# Patient Record
Sex: FEMALE | Race: OTHER | NOT HISPANIC OR LATINO
[De-identification: names, ages, dates, MRNs, and addresses within clinical notes are randomized per-mention and may not be internally consistent; named-entity substitution may affect disease eponyms.]

---

## 2021-01-04 DIAGNOSIS — Z82.5 FAMILY HISTORY OF ASTHMA AND OTHER CHRONIC LOWER RESPIRATORY DISEASES: ICD-10-CM

## 2021-01-04 DIAGNOSIS — R92.2 INCONCLUSIVE MAMMOGRAM: ICD-10-CM

## 2021-01-04 DIAGNOSIS — Z86.000 PERSONAL HISTORY OF IN-SITU NEOPLASM OF BREAST: ICD-10-CM

## 2021-01-04 DIAGNOSIS — Z80.1 FAMILY HISTORY OF MALIGNANT NEOPLASM OF TRACHEA, BRONCHUS AND LUNG: ICD-10-CM

## 2021-01-04 DIAGNOSIS — I80.9 PHLEBITIS AND THROMBOPHLEBITIS OF UNSPECIFIED SITE: ICD-10-CM

## 2021-01-04 DIAGNOSIS — Z86.39 PERSONAL HISTORY OF OTHER ENDOCRINE, NUTRITIONAL AND METABOLIC DISEASE: ICD-10-CM

## 2021-01-04 DIAGNOSIS — Z87.891 PERSONAL HISTORY OF NICOTINE DEPENDENCE: ICD-10-CM

## 2021-01-04 DIAGNOSIS — Z82.49 FAMILY HISTORY OF ISCHEMIC HEART DISEASE AND OTHER DISEASES OF THE CIRCULATORY SYSTEM: ICD-10-CM

## 2021-01-04 PROBLEM — Z00.00 ENCOUNTER FOR PREVENTIVE HEALTH EXAMINATION: Status: ACTIVE | Noted: 2021-01-04

## 2021-01-04 RX ORDER — PRAVASTATIN SODIUM 40 MG/1
40 TABLET ORAL
Refills: 0 | Status: ACTIVE | COMMUNITY

## 2021-01-06 ENCOUNTER — APPOINTMENT (OUTPATIENT)
Dept: BREAST CENTER | Facility: CLINIC | Age: 76
End: 2021-01-06

## 2021-05-12 ENCOUNTER — NON-APPOINTMENT (OUTPATIENT)
Age: 76
End: 2021-05-12

## 2021-05-12 ENCOUNTER — APPOINTMENT (OUTPATIENT)
Dept: BREAST CENTER | Facility: CLINIC | Age: 76
End: 2021-05-12
Payer: MEDICARE

## 2021-05-12 VITALS
OXYGEN SATURATION: 100 % | HEART RATE: 96 BPM | WEIGHT: 105 LBS | BODY MASS INDEX: 19.83 KG/M2 | SYSTOLIC BLOOD PRESSURE: 164 MMHG | HEIGHT: 61 IN | DIASTOLIC BLOOD PRESSURE: 100 MMHG

## 2021-05-12 PROCEDURE — 99072 ADDL SUPL MATRL&STAF TM PHE: CPT

## 2021-05-12 PROCEDURE — 99213 OFFICE O/P EST LOW 20 MIN: CPT

## 2021-05-12 RX ORDER — CRANBERRY FRUIT EXTRACT 200 MG
CAPSULE ORAL
Refills: 0 | Status: ACTIVE | COMMUNITY

## 2021-05-12 RX ORDER — CHROMIUM 200 MCG
TABLET ORAL
Refills: 0 | Status: ACTIVE | COMMUNITY

## 2021-05-12 RX ORDER — ASPIRIN 325 MG
TABLET ORAL
Refills: 0 | Status: ACTIVE | COMMUNITY

## 2021-05-12 NOTE — PHYSICAL EXAM
[Normocephalic] : normocephalic [Atraumatic] : atraumatic [EOMI] : extra ocular movement intact [Supple] : supple [No Supraclavicular Adenopathy] : no supraclavicular adenopathy [No Cervical Adenopathy] : no cervical adenopathy [Examined in the supine and seated position] : examined in the supine and seated position [No dominant masses] : no dominant masses in right breast  [No dominant masses] : no dominant masses left breast [No Nipple Retraction] : no left nipple retraction [No Nipple Discharge] : no left nipple discharge [Breast Mass Right Breast ___cm] : no masses [Breast Mass Left Breast ___cm] : no masses [Breast Nipple Inversion] : nipples not inverted [Breast Nipple Retraction] : nipples not retracted [Breast Nipple Flattening] : nipples not flattened [Breast Nipple Fissures] : nipples not fissured [Breast Abnormal Lactation (Galactorrhea)] : no galactorrhea [Breast Abnormal Secretion Bloody Fluid] : no bloody discharge [Breast Abnormal Secretion Serous Fluid] : no serous discharge [Breast Abnormal Secretion Opalescent Fluid] : no milky discharge [No Edema] : no edema [No Rashes] : no rashes [No Ulceration] : no ulceration [de-identified] : On exam, the patient has A-cup breasts.  On palpation I cannot feel any suspicious densities in either breast.  She has no axillary, supraclavicular, or cervical adenopathy. 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

## 2021-05-12 NOTE — HISTORY OF PRESENT ILLNESS
[FreeTextEntry1] : The patient is a 76-year-old G1, P2 postmenopausal white female who had a set of twins at age 41.  She underwent menopause at in her early 50s and was on hormone replacement therapy for 5 years in the past.  She was diagnosed with a right breast DCIS in 2001 and was treated down to [facility] and underwent a wide excision and radiation therapy through Dr. Wise at [place].  She did not have any nodes removed.  She did not receive any chemotherapy.  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  She has no family history of breast or ovarian cancer.  She was followed by Dr. Chaim Trinidad as well.  She has been doing well and comes in for routine yearly exam and gets yearly mammography.

## 2021-05-12 NOTE — PAST MEDICAL HISTORY
[Postmenopausal] : The patient is postmenopausal [Menopause Age____] : age at menopause was [unfilled] [History of Hormone Replacement Treatment] : has a history of hormone replacement treatment [Total Preg ___] : G[unfilled] [Live Births ___] : P[unfilled]  [Age At Live Birth ___] : Age at live birth: [unfilled] [Menarche Age ____] : age at menarche was [unfilled] [Abortions ___] : Abortions:[unfilled]

## 2021-05-12 NOTE — REASON FOR VISIT
[Follow-Up: _____] : a [unfilled] follow-up visit [FreeTextEntry1] : The patient comes in with a history of a right breast partial mastectomy performed back in 2001 for DCIS at [place] for which she underwent radiation therapy but took no endocrine therapy.

## 2021-05-12 NOTE — ASSESSMENT
[FreeTextEntry1] : The patient is a 76-year-old G1, P2 postmenopausal white female who had a set of twins at age 41.  She underwent menopause at in her early 50s and was on hormone replacement therapy for 5 years in the past.  She was diagnosed with a right breast DCIS in 2001 and was treated down to [facility] and underwent a wide excision and radiation therapy through Dr. Wise at [place].  She did not have any nodes removed.  She did not receive any chemotherapy.  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  She has no family history of breast or ovarian cancer.  She was followed by Dr. Chaim hollingsworth as well.  On exam today I cannot feel any evidence of recurrence in the right breast and no suspicious findings in the left breast.  She underwent her last bilateral mammography and ultrasound at [facility] on May 5, 2021 which showed no suspicious findings.  The patient was reassured and she will continue yearly follow-up and her next bilateral mammography and ultrasound will be due at that time in May 2022.

## 2022-05-31 NOTE — PAST MEDICAL HISTORY
[Postmenopausal] : The patient is postmenopausal [Menarche Age ____] : age at menarche was [unfilled] [Menopause Age____] : age at menopause was [unfilled] [History of Hormone Replacement Treatment] : has a history of hormone replacement treatment [Total Preg ___] : G[unfilled] [Live Births ___] : P[unfilled]  [Abortions ___] : Abortions:[unfilled] [Age At Live Birth ___] : Age at live birth: [unfilled]

## 2022-06-02 ENCOUNTER — APPOINTMENT (OUTPATIENT)
Dept: BREAST CENTER | Facility: CLINIC | Age: 77
End: 2022-06-02
Payer: MEDICARE

## 2022-06-02 VITALS
DIASTOLIC BLOOD PRESSURE: 72 MMHG | SYSTOLIC BLOOD PRESSURE: 108 MMHG | WEIGHT: 105 LBS | BODY MASS INDEX: 19.84 KG/M2 | HEART RATE: 74 BPM | OXYGEN SATURATION: 99 %

## 2022-06-02 PROCEDURE — 99213 OFFICE O/P EST LOW 20 MIN: CPT

## 2022-06-02 NOTE — PHYSICAL EXAM
[Normocephalic] : normocephalic [Atraumatic] : atraumatic [EOMI] : extra ocular movement intact [Supple] : supple [No Supraclavicular Adenopathy] : no supraclavicular adenopathy [No Cervical Adenopathy] : no cervical adenopathy [Examined in the supine and seated position] : examined in the supine and seated position [No dominant masses] : no dominant masses in right breast  [No dominant masses] : no dominant masses left breast [No Nipple Retraction] : no left nipple retraction [No Nipple Discharge] : no left nipple discharge [Breast Mass Right Breast ___cm] : no masses [Breast Mass Left Breast ___cm] : no masses [No Edema] : no edema [No Rashes] : no rashes [No Ulceration] : no ulceration [Breast Nipple Inversion] : nipples not inverted [Breast Nipple Retraction] : nipples not retracted [Breast Nipple Flattening] : nipples not flattened [Breast Nipple Fissures] : nipples not fissured [Breast Abnormal Lactation (Galactorrhea)] : no galactorrhea [Breast Abnormal Secretion Bloody Fluid] : no bloody discharge [Breast Abnormal Secretion Serous Fluid] : no serous discharge [Breast Abnormal Secretion Opalescent Fluid] : no milky discharge [de-identified] : On exam, the patient has A-cup breasts.  On palpation I cannot feel any suspicious densities in either breast.  She has no axillary, supraclavicular, or cervical adenopathy. 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

## 2022-06-02 NOTE — HISTORY OF PRESENT ILLNESS
[FreeTextEntry1] : The patient is a 77-year-old G1, P2 postmenopausal white female who had a set of twins at age 41.  She underwent menopause at in her early 50s and was on hormone replacement therapy for 5 years in the past.  She was diagnosed with a right breast DCIS in 2001 and was treated down to [facility] and underwent a wide excision and radiation therapy through Dr. Wise at [place].  She did not have any nodes removed.  She did not receive any chemotherapy.  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  She has no family history of breast or ovarian cancer.  She was followed by Dr. Chaim Trinidad as well.  She has been doing well and comes in for routine yearly exam and gets yearly mammography.

## 2022-06-02 NOTE — REASON FOR VISIT
[Follow-Up: _____] : a [unfilled] follow-up visit [FreeTextEntry1] : The patient comes in with a history of a right breast partial mastectomy performed back in 2001 for DCIS at [place] for which she underwent radiation therapy but took no endocrine therapy.

## 2022-06-02 NOTE — ASSESSMENT
[FreeTextEntry1] : The patient is a 77-year-old G1, P2 postmenopausal white female who had a set of twins at age 41.  She underwent menopause at in her early 50s and was on hormone replacement therapy for 5 years in the past.  She was diagnosed with a right breast DCIS in 2001 and was treated down to [facility] and underwent a wide excision and radiation therapy through Dr. Wise at [place].  She did not have any nodes removed.  She did not receive any chemotherapy.  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  She has no family history of breast or ovarian cancer.  She was followed by Dr. Chaim Kirkland as well.  On exam today, I cannot feel any evidence of recurrence in the right breast and no suspicious findings in the left breast.  She underwent her last last bilateral mammography and ultrasound which was reviewed from May 9, 2022 and performed at [facility] which showed no suspicious findings.  The patient was reassured and she will continue yearly follow-up and her next bilateral mammography and ultrasound will be due at that time in May 2023 and she was given prescriptions.

## 2023-05-02 ENCOUNTER — NON-APPOINTMENT (OUTPATIENT)
Age: 78
End: 2023-05-02

## 2023-05-02 NOTE — PHYSICAL EXAM
[Normocephalic] : normocephalic [Atraumatic] : atraumatic [EOMI] : extra ocular movement intact [Supple] : supple [No Supraclavicular Adenopathy] : no supraclavicular adenopathy [No Cervical Adenopathy] : no cervical adenopathy [Examined in the supine and seated position] : examined in the supine and seated position [No dominant masses] : no dominant masses in right breast  [No dominant masses] : no dominant masses left breast [No Nipple Retraction] : no left nipple retraction [No Nipple Discharge] : no left nipple discharge [Breast Mass Right Breast ___cm] : no masses [Breast Mass Left Breast ___cm] : no masses [No Edema] : no edema [No Rashes] : no rashes [No Ulceration] : no ulceration [Breast Nipple Inversion] : nipples not inverted [Breast Nipple Retraction] : nipples not retracted [Breast Nipple Flattening] : nipples not flattened [Breast Nipple Fissures] : nipples not fissured [Breast Abnormal Lactation (Galactorrhea)] : no galactorrhea [Breast Abnormal Secretion Bloody Fluid] : no bloody discharge [Breast Abnormal Secretion Serous Fluid] : no serous discharge [Breast Abnormal Secretion Opalescent Fluid] : no milky discharge [de-identified] : On exam, the patient has A-cup breasts.  On palpation I cannot feel any suspicious densities in either breast.  She has no axillary, supraclavicular, or cervical adenopathy. 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

## 2023-05-02 NOTE — REASON FOR VISIT
[Follow-Up: _____] : a [unfilled] follow-up visit [FreeTextEntry1] : The patient comes in with a history of a right breast partial mastectomy performed back in 2001 for DCIS at [place] for which she underwent radiation therapy but took no endocrine therapy.

## 2023-05-02 NOTE — HISTORY OF PRESENT ILLNESS
[FreeTextEntry1] : The patient is a 78-year-old G1, P2 postmenopausal white female who had a set of twins at age 41.  She underwent menopause at in her early 50s and was on hormone replacement therapy for 5 years in the past.  She was diagnosed with a right breast DCIS in 2001 and was treated down to [facility] and underwent a wide excision and radiation therapy through Dr. Yan at [place].  She did not have any nodes removed.  She did not receive any chemotherapy.  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  She has no family history of breast or ovarian cancer.  She was followed by Dr. Chaim Trinidad as well.  She has been doing well and comes in for routine yearly exam and gets yearly mammography.

## 2023-05-02 NOTE — ASSESSMENT
[FreeTextEntry1] : The patient is a 78-year-old G1, P2 postmenopausal white female who had a set of twins at age 41.  She underwent menopause at in her early 50s and was on hormone replacement therapy for 5 years in the past.  She was diagnosed with a right breast DCIS in 2001 and was treated down at [facility] and underwent a wide excision and radiation therapy through Dr. Yan at [place].  She did not have any nodes removed.  She did not receive any chemotherapy.  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  She has no family history of breast or ovarian cancer.  She was followed by Dr. Chaim Kirkland as well.  On exam today, I cannot feel any evidence of recurrence in the right breast and no suspicious findings in the left breast.  She underwent her last last bilateral mammography and ultrasound which was reviewed from ??????? May 9, 2022 and performed at [facility] which showed no suspicious findings.  The patient was reassured and she will continue yearly follow-up and her next bilateral mammography and ultrasound will be due at that time in ????????? May 2024 and she was given prescriptions.

## 2023-05-09 ENCOUNTER — RESULT REVIEW (OUTPATIENT)
Age: 78
End: 2023-05-09

## 2023-05-10 ENCOUNTER — APPOINTMENT (OUTPATIENT)
Dept: BREAST CENTER | Facility: CLINIC | Age: 78
End: 2023-05-10
Payer: MEDICARE

## 2023-05-10 PROCEDURE — 99213 OFFICE O/P EST LOW 20 MIN: CPT

## 2023-05-10 NOTE — ASSESSMENT
[FreeTextEntry1] : The patient is a 78-year-old G1, P2 postmenopausal white female who had a set of twins at age 41.  She underwent menopause at in her early 50s and was on hormone replacement therapy for 5 years in the past.  She was diagnosed with a right breast DCIS in 2001 and was treated down at [facility] and underwent a wide excision and radiation therapy through Dr. Yan at [place].  She did not have any nodes removed.  She did not receive any chemotherapy.  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  She has no family history of breast or ovarian cancer.  She was followed by Dr. Chaim Kirkland as well.  On exam today, I cannot feel any evidence of recurrence in the right breast and no suspicious findings in the left breast.  She underwent her last last bilateral mammography and ultrasound which was reviewed from today on May 10, 2023 and performed at [facility] which showed no suspicious findings.  The patient was reassured and she will continue yearly follow-up and her next bilateral mammography and ultrasound will be due at that time in May 2024 and she was given prescriptions.

## 2023-05-10 NOTE — REASON FOR VISIT
[Follow-Up: _____] : a [unfilled] follow-up visit [FreeTextEntry1] : The patient comes in with a history of a right breast partial mastectomy performed back in 2001 for DCIS at [place] for which she underwent radiation therapy but took no endocrine therapy.

## 2023-05-10 NOTE — HISTORY OF PRESENT ILLNESS
[FreeTextEntry1] : The patient is a 78-year-old G1, P2 postmenopausal white female who had a set of twins at age 41.  She underwent menopause at in her early 50s and was on hormone replacement therapy for 5 years in the past.  She was diagnosed with a right breast DCIS in 2001 and was treated down to [facility] and underwent a wide excision and radiation therapy through Dr. Yan at [place].  She did not have any nodes removed.  She did not receive any chemotherapy.  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  She has no family history of breast or ovarian cancer.  She was followed by Dr. Chaim Trinidad as well.  She has been doing well and comes in for routine yearly exam and gets yearly mammography.

## 2023-05-10 NOTE — PHYSICAL EXAM
[Normocephalic] : normocephalic [Atraumatic] : atraumatic [EOMI] : extra ocular movement intact [Supple] : supple [No Supraclavicular Adenopathy] : no supraclavicular adenopathy [No Cervical Adenopathy] : no cervical adenopathy [Examined in the supine and seated position] : examined in the supine and seated position [No dominant masses] : no dominant masses left breast [No dominant masses] : no dominant masses in right breast  [No Nipple Retraction] : no left nipple retraction [No Nipple Discharge] : no left nipple discharge [Breast Mass Right Breast ___cm] : no masses [Breast Mass Left Breast ___cm] : no masses [No Edema] : no edema [No Rashes] : no rashes [No Ulceration] : no ulceration [Breast Nipple Inversion] : nipples not inverted [Breast Nipple Retraction] : nipples not retracted [Breast Nipple Flattening] : nipples not flattened [Breast Nipple Fissures] : nipples not fissured [Breast Abnormal Lactation (Galactorrhea)] : no galactorrhea [Breast Abnormal Secretion Bloody Fluid] : no bloody discharge [Breast Abnormal Secretion Serous Fluid] : no serous discharge [Breast Abnormal Secretion Opalescent Fluid] : no milky discharge [de-identified] : On exam, the patient has A-cup breasts.  On palpation I cannot feel any suspicious densities in either breast.  She has no axillary, supraclavicular, or cervical adenopathy. 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

## 2024-04-25 ENCOUNTER — NON-APPOINTMENT (OUTPATIENT)
Age: 79
End: 2024-04-25

## 2024-05-15 NOTE — REASON FOR VISIT
[Follow-Up: _____] : a [unfilled] follow-up visit [FreeTextEntry1] : The patient comes in with a history of a right breast partial mastectomy performed back in 2001 for DCIS at [place] for which she underwent radiation therapy but took no endocrine therapy.

## 2024-05-15 NOTE — ASSESSMENT
[FreeTextEntry1] : The patient is a 79-year-old G1, P2 postmenopausal white female who had a set of twins at age 41.  She underwent menopause at in her early 50s and was on hormone replacement therapy for 5 years in the past.  She was diagnosed with a right breast DCIS in 2001 and was treated down at [facility] and underwent a wide excision and radiation therapy through Dr. Yan at [place].  She did not have any nodes removed.  She did not receive any chemotherapy.  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  She has no family history of breast or ovarian cancer.  She was followed by Dr. Chaim Kirkland as well.  On exam today, I cannot feel any evidence of recurrence in the right breast and no suspicious findings in the left breast.  She underwent her last bilateral mammography and ultrasound which was reviewed from today on ?????? May 10, 2023 and performed at [facility] which showed no suspicious findings.  The patient was reassured and she will continue yearly follow-up and her next bilateral mammography and ultrasound will be due at that time in ?????? May 2025 and she was given prescriptions.

## 2024-05-15 NOTE — HISTORY OF PRESENT ILLNESS
[FreeTextEntry1] : The patient is a 79-year-old G1, P2 postmenopausal white female who had a set of twins at age 41.  She underwent menopause at in her early 50s and was on hormone replacement therapy for 5 years in the past.  She was diagnosed with a right breast DCIS in 2001 and was treated down to [facility] and underwent a wide excision and radiation therapy through Dr. Yan at [place].  She did not have any nodes removed.  She did not receive any chemotherapy.  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  She has no family history of breast or ovarian cancer.  She was followed by Dr. Chaim Trinidad as well.  She has been doing well and comes in for routine yearly exam and gets yearly mammography.

## 2024-05-15 NOTE — PHYSICAL EXAM
[Normocephalic] : normocephalic [Atraumatic] : atraumatic [EOMI] : extra ocular movement intact [Supple] : supple [No Supraclavicular Adenopathy] : no supraclavicular adenopathy [No Cervical Adenopathy] : no cervical adenopathy [Examined in the supine and seated position] : examined in the supine and seated position [No dominant masses] : no dominant masses in right breast  [No dominant masses] : no dominant masses left breast [No Nipple Retraction] : no left nipple retraction [No Nipple Discharge] : no left nipple discharge [Breast Mass Right Breast ___cm] : no masses [Breast Mass Left Breast ___cm] : no masses [No Edema] : no edema [No Rashes] : no rashes [No Ulceration] : no ulceration [Breast Nipple Inversion] : nipples not inverted [Breast Nipple Retraction] : nipples not retracted [Breast Nipple Flattening] : nipples not flattened [Breast Nipple Fissures] : nipples not fissured [Breast Abnormal Lactation (Galactorrhea)] : no galactorrhea [Breast Abnormal Secretion Bloody Fluid] : no bloody discharge [Breast Abnormal Secretion Serous Fluid] : no serous discharge [Breast Abnormal Secretion Opalescent Fluid] : no milky discharge [de-identified] : On exam, the patient has A-cup breasts.  On palpation I cannot feel any suspicious densities in either breast.  She has no axillary, supraclavicular, or cervical adenopathy. 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

## 2024-05-21 ENCOUNTER — RESULT REVIEW (OUTPATIENT)
Age: 79
End: 2024-05-21

## 2024-05-22 ENCOUNTER — APPOINTMENT (OUTPATIENT)
Dept: BREAST CENTER | Facility: CLINIC | Age: 79
End: 2024-05-22
Payer: MEDICARE

## 2024-05-22 VITALS
HEART RATE: 82 BPM | BODY MASS INDEX: 19.83 KG/M2 | SYSTOLIC BLOOD PRESSURE: 111 MMHG | OXYGEN SATURATION: 99 % | DIASTOLIC BLOOD PRESSURE: 74 MMHG | WEIGHT: 105 LBS | HEIGHT: 61 IN

## 2024-05-22 DIAGNOSIS — Z12.39 ENCOUNTER FOR OTHER SCREENING FOR MALIGNANT NEOPLASM OF BREAST: ICD-10-CM

## 2024-05-22 DIAGNOSIS — Z90.11 ACQUIRED ABSENCE OF RIGHT BREAST AND NIPPLE: ICD-10-CM

## 2024-05-22 DIAGNOSIS — Z85.3 PERSONAL HISTORY OF MALIGNANT NEOPLASM OF BREAST: ICD-10-CM

## 2024-05-22 PROCEDURE — 99213 OFFICE O/P EST LOW 20 MIN: CPT

## 2024-05-22 NOTE — ASSESSMENT
[FreeTextEntry1] : The patient is a 79-year-old G1, P2 postmenopausal white female who had a set of twins at age 41.  She underwent menopause at in her early 50s and was on hormone replacement therapy for 5 years in the past.  She was diagnosed with a right breast DCIS in 2001 and was treated down at [facility] and underwent a wide excision and radiation therapy through Dr. Yan at [place].  She did not have any nodes removed.  She did not receive any chemotherapy.  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  She has no family history of breast or ovarian cancer.  She was followed by Dr. Chaim Kirkland as well.  On exam today, I cannot feel any evidence of recurrence in the right breast and no suspicious findings in the left breast.  She underwent her last bilateral mammography and ultrasound which was reviewed from today on May 22, 2024 and performed at [facility] which showed no suspicious findings.  The patient was reassured and she will continue yearly follow-up and her next bilateral mammography and ultrasound will be due at that time in May 2025 and she was given prescriptions.

## 2024-05-22 NOTE — PHYSICAL EXAM
[Normocephalic] : normocephalic [Atraumatic] : atraumatic [EOMI] : extra ocular movement intact [Supple] : supple [No Supraclavicular Adenopathy] : no supraclavicular adenopathy [No Cervical Adenopathy] : no cervical adenopathy [Examined in the supine and seated position] : examined in the supine and seated position [No dominant masses] : no dominant masses in right breast  [No dominant masses] : no dominant masses left breast [No Nipple Retraction] : no left nipple retraction [No Nipple Discharge] : no left nipple discharge [Breast Mass Right Breast ___cm] : no masses [Breast Mass Left Breast ___cm] : no masses [No Rashes] : no rashes [No Edema] : no edema [No Ulceration] : no ulceration [Breast Nipple Inversion] : nipples not inverted [Breast Nipple Retraction] : nipples not retracted [Breast Nipple Flattening] : nipples not flattened [Breast Nipple Fissures] : nipples not fissured [Breast Abnormal Lactation (Galactorrhea)] : no galactorrhea [Breast Abnormal Secretion Bloody Fluid] : no bloody discharge [Breast Abnormal Secretion Serous Fluid] : no serous discharge [Breast Abnormal Secretion Opalescent Fluid] : no milky discharge [de-identified] : On exam, the patient has A-cup breasts.  On palpation I cannot feel any suspicious densities in either breast.  She has no axillary, supraclavicular, or cervical adenopathy. 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

## 2024-05-22 NOTE — REASON FOR VISIT
[Follow-Up: _____] : a [unfilled] follow-up visit [FreeTextEntry1] : The patient comes in with a history of a right breast partial mastectomy performed back in 2001 for DCIS at [place] for which she underwent radiation therapy but took no endocrine therapy.

## 2025-04-16 ENCOUNTER — NON-APPOINTMENT (OUTPATIENT)
Age: 80
End: 2025-04-16

## 2025-05-27 ENCOUNTER — APPOINTMENT (OUTPATIENT)
Dept: BREAST CENTER | Facility: CLINIC | Age: 80
End: 2025-05-27
Payer: MEDICARE

## 2025-05-27 ENCOUNTER — NON-APPOINTMENT (OUTPATIENT)
Age: 80
End: 2025-05-27

## 2025-05-27 ENCOUNTER — RESULT REVIEW (OUTPATIENT)
Age: 80
End: 2025-05-27

## 2025-05-27 VITALS
OXYGEN SATURATION: 96 % | BODY MASS INDEX: 20.2 KG/M2 | WEIGHT: 107 LBS | HEIGHT: 61 IN | DIASTOLIC BLOOD PRESSURE: 70 MMHG | SYSTOLIC BLOOD PRESSURE: 131 MMHG | HEART RATE: 96 BPM

## 2025-05-27 DIAGNOSIS — Z90.11 ACQUIRED ABSENCE OF RIGHT BREAST AND NIPPLE: ICD-10-CM

## 2025-05-27 DIAGNOSIS — Z85.3 PERSONAL HISTORY OF MALIGNANT NEOPLASM OF BREAST: ICD-10-CM

## 2025-05-27 DIAGNOSIS — Z12.39 ENCOUNTER FOR OTHER SCREENING FOR MALIGNANT NEOPLASM OF BREAST: ICD-10-CM

## 2025-05-27 PROCEDURE — 99213 OFFICE O/P EST LOW 20 MIN: CPT
